# Patient Record
Sex: MALE | Race: WHITE | Employment: UNEMPLOYED | URBAN - METROPOLITAN AREA
[De-identification: names, ages, dates, MRNs, and addresses within clinical notes are randomized per-mention and may not be internally consistent; named-entity substitution may affect disease eponyms.]

---
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
## 2023-05-29 ENCOUNTER — HOSPITAL ENCOUNTER (EMERGENCY)
Age: 23
Discharge: HOME | End: 2023-05-29
Payer: COMMERCIAL

## 2023-05-29 VITALS
RESPIRATION RATE: 16 BRPM | WEIGHT: 167 LBS | HEART RATE: 73 BPM | BODY MASS INDEX: 22.62 KG/M2 | TEMPERATURE: 98.8 F | SYSTOLIC BLOOD PRESSURE: 124 MMHG | OXYGEN SATURATION: 100 % | HEIGHT: 72 IN | DIASTOLIC BLOOD PRESSURE: 71 MMHG

## 2023-05-29 DIAGNOSIS — S01.512A LACERATION OF ORAL CAVITY, INITIAL ENCOUNTER: Primary | ICD-10-CM

## 2023-05-29 PROCEDURE — 99211 OFF/OP EST MAY X REQ PHY/QHP: CPT

## 2023-05-29 RX ORDER — AMOXICILLIN 500 MG/1
500 CAPSULE ORAL 3 TIMES DAILY
Qty: 21 CAPSULE | Refills: 0 | Status: SHIPPED | OUTPATIENT
Start: 2023-05-29 | End: 2023-06-05

## 2023-05-29 ASSESSMENT — PAIN DESCRIPTION - DESCRIPTORS: DESCRIPTORS: SORE

## 2023-05-29 ASSESSMENT — PAIN DESCRIPTION - LOCATION: LOCATION: MOUTH

## 2023-05-29 ASSESSMENT — PAIN SCALES - GENERAL: PAINLEVEL_OUTOF10: 1

## 2023-05-29 ASSESSMENT — PAIN - FUNCTIONAL ASSESSMENT: PAIN_FUNCTIONAL_ASSESSMENT: 0-10

## 2023-05-29 ASSESSMENT — PAIN DESCRIPTION - PAIN TYPE: TYPE: ACUTE PAIN

## 2023-05-29 ASSESSMENT — PAIN DESCRIPTION - ONSET: ONSET: SUDDEN

## 2023-05-29 ASSESSMENT — PAIN DESCRIPTION - FREQUENCY: FREQUENCY: CONTINUOUS

## 2023-05-29 NOTE — ED PROVIDER NOTES
[address] ENCOUNTER        Pt Name: Amita Small  MRN: [id number]  [place] 2000  Date of evaluation: 5/29/2023  Provider: KEYLA Bedoya CNP  PCP: Rita Howe MD  Note Started: 2:18 PM EDT 5/29/23    CHIEF COMPLAINT       Chief Complaint   Patient presents with    Laceration     States last night around 10:00 pm he got hit in the face with some ones knee while playing a game. States he had a nosebleed. Has small cut on lower lip and an open wound inside his mouth from his tooth. Denies LOC. HISTORY OF PRESENT ILLNESS: 1 or more Elements   History From: patient    Limitations to history : None    Chaitanya Evangelista is a 23 y.o. male who presents by someone's name is playing a sports game. He has a laceration on the inner aspect of the lower lip and an abrasion on the outer aspect of the lip. He states his teeth are intact he has no problems with his teeth or any other problems. Nursing Notes were all reviewed and agreed with or any disagreements were addressed in the HPI. REVIEW OF SYSTEMS :      Review of Systems    Positives and Pertinent negatives as per HPI. SURGICAL HISTORY   History reviewed. No pertinent surgical history. CURRENTMEDICATIONS       Previous Medications    IBUPROFEN (MOTRIN PO)    Take 600 mg by mouth Motrin  This am       ALLERGIES     Bactrim [sulfamethoxazole-trimethoprim]    FAMILYHISTORY     History reviewed. No pertinent family history.      SOCIAL HISTORY       Social History     Tobacco Use    Smoking status: Every Day     Types: E-Cigarettes    Smokeless tobacco: Never   Vaping Use    Vaping Use: Every day    Substances: Nicotine       SCREENINGS                         CIWA Assessment  BP: 124/71  Pulse: 73           PHYSICAL EXAM  1 or more Elements     ED Triage Vitals [05/29/23 1359]   BP Temp Temp Source Pulse Respirations SpO2 Height Weight - Scale   124/71 98.8 °F (37.1 °C) Infrared 73 16